# Patient Record
Sex: MALE | Race: WHITE | NOT HISPANIC OR LATINO | Employment: STUDENT | URBAN - METROPOLITAN AREA
[De-identification: names, ages, dates, MRNs, and addresses within clinical notes are randomized per-mention and may not be internally consistent; named-entity substitution may affect disease eponyms.]

---
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
## 2024-01-22 ENCOUNTER — OFFICE VISIT (OUTPATIENT)
Dept: PSYCHIATRY | Facility: CLINIC | Age: 19
End: 2024-01-22
Payer: COMMERCIAL

## 2024-01-22 VITALS
DIASTOLIC BLOOD PRESSURE: 90 MMHG | BODY MASS INDEX: 41.03 KG/M2 | HEART RATE: 85 BPM | HEIGHT: 70 IN | WEIGHT: 286.6 LBS | SYSTOLIC BLOOD PRESSURE: 147 MMHG

## 2024-01-22 DIAGNOSIS — F41.1 GENERALIZED ANXIETY DISORDER: ICD-10-CM

## 2024-01-22 DIAGNOSIS — F32.4 MAJOR DEPRESSIVE DISORDER WITH SINGLE EPISODE, IN PARTIAL REMISSION (HCC): Primary | ICD-10-CM

## 2024-01-22 DIAGNOSIS — F33.41 RECURRENT MAJOR DEPRESSIVE DISORDER, IN PARTIAL REMISSION (HCC): ICD-10-CM

## 2024-01-22 PROCEDURE — 90833 PSYTX W PT W E/M 30 MIN: CPT | Performed by: STUDENT IN AN ORGANIZED HEALTH CARE EDUCATION/TRAINING PROGRAM

## 2024-01-22 PROCEDURE — 99214 OFFICE O/P EST MOD 30 MIN: CPT | Performed by: STUDENT IN AN ORGANIZED HEALTH CARE EDUCATION/TRAINING PROGRAM

## 2024-01-22 RX ORDER — ESCITALOPRAM OXALATE 10 MG/1
10 TABLET ORAL DAILY
Qty: 90 TABLET | Refills: 0 | Status: SHIPPED | OUTPATIENT
Start: 2024-01-22

## 2024-01-22 RX ORDER — ESCITALOPRAM OXALATE 20 MG/1
20 TABLET ORAL DAILY
Qty: 90 TABLET | Refills: 0 | Status: SHIPPED | OUTPATIENT
Start: 2024-01-22 | End: 2024-04-21

## 2024-01-22 RX ORDER — BUPROPION HYDROCHLORIDE 100 MG/1
TABLET ORAL
Qty: 45 TABLET | Refills: 1 | Status: SHIPPED | OUTPATIENT
Start: 2024-01-22 | End: 2024-02-01 | Stop reason: SDUPTHER

## 2024-01-22 NOTE — BH CRISIS PLAN
Client Name: Vazquez Lopez       Client YOB: 2005    [facility]      Creation Date: 1/22/24 Update Date: 1/22/24   Created By: Duke Sanon MD Last Updated By: Duke Sanon MD      Step 1: Warning Signs:   Warning Signs   Stop playing video games, missing days of work, gains in weight            Step 2: Internal Coping Strategies:   Internal Coping Strategies   Playing video games, listening to music            Step 3: People and social settings that provide distraction:   Name Contact Information   Work, spending time with friends Calling phone            Step 4: People whom I can ask for help during a crisis:      Name Contact Information    Group of friends Call them    Parents Call them      Step 5: Professionals or agencies I can contact during a crisis:      Clinican/Agency Name Phone Emergency Contact    Duke Sanon M.D. [phone]       [facility] Emergency [facility] Emergency Department Phone Emergency Department Address    [id number]          Crisis Phone Numbers:   Suicide Prevention Lifeline: Call or Text  [address] Crisis Text Line: Text HOME to 931-746   Please note: Some [facility] do not have a separate number for Child/Adolescent specific crisis. If your county is not listed under Child/Adolescent, please call the adult number for your county      Adult Crisis Numbers: Child/Adolescent Crisis Numbers   [facility]: [phone] [facility]: [phone]   [facility]: [phone] [facility]: [phone]   [facility]: [phone] [place]: [phone]   [facility]: [phone] [place]: [phone]   [place]: [phone]   [facility]: [phone]   [facility]: [phone]   [facility]: [phone] (daytime) [phone] (after hours, weekends, holidays)      Step 6: Making the environment safer (plan for lethal means safety):   Patient did not identify any lethal methods:  Yes     Optional: What is most important to me and worth living for?   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 Taya Hopkins and Fer Astudillo. Used with permission of the authors.

## 2024-01-22 NOTE — BH TREATMENT PLAN
TREATMENT PLAN (Medication Management Only)        [facility]    Name and Date of Birth:  Vazquez Lopez 18 y.o. 2005  Date of Treatment Plan: January 22, 2024  Diagnosis/Diagnoses:    1. Major depressive disorder with single episode, in partial remission (HCC)    2. Generalized anxiety disorder    3. Recurrent major depressive disorder, in partial remission (HCC)      Strengths/Personal Resources for Self-Care: supportive family, taking medications as prescribed, ability to communicate needs, ability to listen.  Area/Areas of need (in own words): anxiety symptoms, depressive symptoms.  1. Long Term Goal: improve anxiety, improve depression.   Target Date: 1 year - 1/22/2025  Person/Persons responsible for completion of goal: Janette Sanon M.D.  2.  Short Term Objective (s) - How will we reach this goal?:   A.  Provider new recommended medication/dosage changes and/or continue medication(s): continue current medications as prescribed.    Target Date: 3 months - 4/22/2024  Person/Persons Responsible for Completion of Goal: Janette Sanon M.D.  Progress Towards Goals: continuing treatment  Treatment Modality: medication management every 3 months  Review due 6 months from date of this plan: 6 months - 7/22/2024  Expected length of service: maintenance unless revised  My Physician/PA/NP and I have developed this plan together and I agree to work on the goals and objectives. I understand the treatment goals that were developed for my treatment.
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
## 2024-02-01 ENCOUNTER — TELEPHONE (OUTPATIENT)
Dept: PSYCHIATRY | Facility: CLINIC | Age: 19
End: 2024-02-01

## 2024-02-01 DIAGNOSIS — F32.4 MAJOR DEPRESSIVE DISORDER WITH SINGLE EPISODE, IN PARTIAL REMISSION (HCC): ICD-10-CM

## 2024-02-01 RX ORDER — BUPROPION HYDROCHLORIDE 100 MG/1
TABLET ORAL
Qty: 45 TABLET | Refills: 0 | Status: SHIPPED | OUTPATIENT
Start: 2024-02-01

## 2024-02-01 NOTE — TELEPHONE ENCOUNTER
"Received a fax from [facility] requesting clarification for the prescription bupropion 100 mg sent with a refill. Requested clarification, noting prescription sent with 1 RF, with instructions to discontinue. \"Does the patient NEED the ONE refill?\"     Form with nursing or rx can be resent without refills.   "
Expected Date Of Service: 10/21/2021
Lab Facility: [id number]
Performing Laboratory: [id number]
Bill For Surgical Tray: no
Billing Type: Third-Party Bill

## 2024-02-02 ENCOUNTER — HOSPITAL ENCOUNTER (EMERGENCY)
Facility: HOSPITAL | Age: 19
Discharge: HOME | End: 2024-02-02
Attending: EMERGENCY MEDICINE
Payer: COMMERCIAL

## 2024-02-02 VITALS
DIASTOLIC BLOOD PRESSURE: 84 MMHG | OXYGEN SATURATION: 95 % | HEART RATE: 87 BPM | TEMPERATURE: 98.5 F | SYSTOLIC BLOOD PRESSURE: 134 MMHG | RESPIRATION RATE: 17 BRPM

## 2024-02-02 DIAGNOSIS — S01.112A LEFT EYELID LACERATION, INITIAL ENCOUNTER: Primary | ICD-10-CM

## 2024-02-02 PROCEDURE — 90471 IMMUNIZATION ADMIN: CPT

## 2024-02-02 PROCEDURE — 99284 EMERGENCY DEPT VISIT MOD MDM: CPT | Performed by: EMERGENCY MEDICINE

## 2024-02-02 PROCEDURE — 90715 TDAP VACCINE 7 YRS/> IM: CPT

## 2024-02-02 PROCEDURE — 12014 RPR F/E/E/N/L/M 5.1-7.5 CM: CPT | Performed by: EMERGENCY MEDICINE

## 2024-02-02 PROCEDURE — 99283 EMERGENCY DEPT VISIT LOW MDM: CPT

## 2024-02-02 RX ORDER — LIDOCAINE HYDROCHLORIDE 10 MG/ML
10 INJECTION, SOLUTION EPIDURAL; INFILTRATION; INTRACAUDAL; PERINEURAL ONCE
Status: COMPLETED | OUTPATIENT
Start: 2024-02-02 | End: 2024-02-02

## 2024-02-02 RX ADMIN — TETANUS TOXOID, REDUCED DIPHTHERIA TOXOID AND ACELLULAR PERTUSSIS VACCINE, ADSORBED 0.5 ML: 5; 2.5; 8; 8; 2.5 SUSPENSION INTRAMUSCULAR at 14:36

## 2024-02-02 RX ADMIN — LIDOCAINE HYDROCHLORIDE 10 ML: 10 INJECTION, SOLUTION EPIDURAL; INFILTRATION; INTRACAUDAL; PERINEURAL at 15:35

## 2024-02-02 NOTE — DISCHARGE INSTRUCTIONS
Please follow-up with ophthalmology for reevaluation of your eyelid laceration.  Either be seen by ophthalmology in 5 to 7 days or return to the emergency department or your primary care office in 5 to 7 days to have the sutures removed.    Return to the emergency department if:   You have heavy bleeding or bleeding that does not stop after 10 minutes of holding firm, direct pressure over the wound.  Your wound opens up.  You have a fever or chills.  Your laceration is red, warm, or swollen.  You have red streaks on your skin coming from your wound.  You have white or yellow drainage from the wound that smells bad.

## 2024-02-02 NOTE — ED PROVIDER NOTES
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 Pt reports holding  and a [place] of wind blew tint away went to grab it and sliced L eyelid.      18M accidentally lacerated left eyelid with box-cutter while working. Did not hit eye, no change in vision.  Last tetanus was approximately a decade ago.  No other injuries.  Review of systems performed and otherwise negative.        Prior to Admission Medications   Prescriptions Last Dose Informant Patient Reported? Taking?   buPROPion (WELLBUTRIN) 100 mg tablet   No No   Sig: Take 100 mg for 1 month, then may decrease to 50 mg for 1 month, then discontinue   buPROPion (Wellbutrin XL) 150 mg 24 hr tablet   No No   Sig: Take 1 tablet (150 mg total) by mouth daily   escitalopram (LEXAPRO) 10 mg tablet   No No   Sig: Take 1 tablet (10 mg total) by mouth daily   escitalopram (LEXAPRO) 20 mg tablet   No No   Sig: Take 1 tablet (20 mg total) by mouth daily      Facility-Administered Medications: None       Past Medical History:   Diagnosis Date    Anxiety     Depression     Head injury     Suicide attempt (HCC)        History reviewed. No pertinent surgical history.    Family History   Problem Relation Age of Onset    No Known Problems Mother     Diabetes Father     Hypertension Father     Hypertension Maternal Grandmother     Atrial fibrillation Maternal Grandmother     COPD Maternal Grandfather     Diabetes Paternal Grandfather     Diabetes Paternal Uncle      I have reviewed and agree with the history as documented.    E-Cigarette/Vaping    E-Cigarette Use Current Some Day User      E-Cigarette/Vaping Substances    Nicotine Yes     THC No     CBD No     Flavoring No     Other No     Unknown No      Social History     Tobacco Use    Smoking status: Never    Smokeless tobacco: Never   Vaping Use    Vaping status: Some Days    Substances: Nicotine   Substance Use Topics    Alcohol use: Never    Drug use:  Yes     Frequency: 1.0 times per week     Types: Marijuana        Review of Systems   All other systems reviewed and are negative.      Physical Exam  ED Triage Vitals [02/02/24 1351]   Temperature Pulse Respirations Blood Pressure SpO2   98.5 °F (36.9 °C) 87 17 134/84 95 %      Temp Source Heart Rate Source Patient Position - Orthostatic VS BP Location FiO2 (%)   Oral Monitor Sitting Right arm --      Pain Score       No Pain             Orthostatic Vital Signs  Vitals:    02/02/24 1351   BP: 134/84   Pulse: 87   Patient Position - Orthostatic VS: Sitting       Physical Exam  Vitals and nursing note reviewed.   Constitutional:       General: He is not in acute distress.     Appearance: He is not ill-appearing.   HENT:      Head: Normocephalic and atraumatic.      Nose: Nose normal.      Mouth/Throat:      Mouth: Mucous membranes are moist.      Pharynx: Oropharynx is clear.   Eyes:      Comments: Laceration traveling horizontally on the superior eyelid, several centimeters away from the medial canthus, superficial, without muscular involvement   Cardiovascular:      Rate and Rhythm: Normal rate and regular rhythm.   Pulmonary:      Effort: Pulmonary effort is normal.      Breath sounds: Normal breath sounds.   Abdominal:      General: Abdomen is flat. Bowel sounds are normal.      Palpations: Abdomen is soft.   Musculoskeletal:         General: Normal range of motion.      Cervical back: Normal range of motion.   Skin:     General: Skin is warm and dry.   Neurological:      General: No focal deficit present.      Mental Status: He is alert and oriented to person, place, and time.   Psychiatric:         Mood and Affect: Mood normal.         ED Medications  Medications   tetanus-diphtheria-acellular pertussis (BOOSTRIX) IM injection 0.5 mL (0.5 mL Intramuscular Given 2/2/24 1436)   lidocaine (PF) (XYLOCAINE-MPF) 1 % injection 10 mL (10 mL Infiltration Given by Other 2/2/24 1535)       Diagnostic Studies  Results  "Reviewed       None                   No orders to display         Procedures  Universal Protocol:  Consent: Verbal consent obtained.  Consent given by: patient  Time out: Immediately prior to procedure a \"time out\" was called to verify the correct patient, procedure, equipment, support staff and site/side marked as required.  Patient identity confirmed: verbally with patient  Laceration repair    Date/Time: 2/2/2024 9:14 PM    Performed by: Roger Bradford MD  Authorized by: Roger Bradford MD  Body area: head/neck  Location details: left eyelid  Laceration length: 6 cm  Foreign bodies: no foreign bodies  Tendon involvement: none  Nerve involvement: none  Vascular damage: no  Anesthesia: local infiltration and nerve block    Anesthesia:  Local Anesthetic: lidocaine 1% without epinephrine    Sedation:  Patient sedated: no      Wound Dehiscence:  Superficial Wound Dehiscence: simple closure      Procedure Details:  Irrigation solution: saline  Irrigation method: jet lavage and syringe  Amount of cleaning: extensive  Debridement: none  Degree of undermining: none  Skin closure: 6-0 Prolene  Number of sutures: 9  Technique: simple  Approximation: close  Approximation difficulty: simple  Patient tolerance: patient tolerated the procedure well with no immediate complications            ED Course                                       Medical Decision Making  Given no medial involvement, no involvement of eyelid margin, not overlying the lacrimal duct or tach, paired laceration in the emergency department.  Updated patient's tetanus.  Recommended follow-up with ophthalmology in 5 to 7 days for suture removal.  Return precautions discussed, all questions answered prior to discharge.    Risk  Prescription drug management.          Disposition  Final diagnoses:   Left eyelid laceration, initial encounter     Time reflects when diagnosis was documented in both MDM as applicable and the Disposition within this note  "      Time User Action Codes Description Comment    2/2/2024  3:27 PM Roger Bradford Add [S01.112A] Left eyelid laceration, initial encounter           ED Disposition       ED Disposition   Discharge    Condition   Stable    Date/Time   Fri Feb 2, 2024  3:27 PM    Comment   Vazquez Lopez discharge to home/self care.                   Follow-up Information       Follow up With Specialties Details Why Contact Info    [facility]   [address] [place] [id number]  [phone]      Augusto Beaulieu MD Ophthalmology   [address] [place] [id number]  [phone]              Discharge Medication List as of 2/2/2024  3:29 PM        CONTINUE these medications which have NOT CHANGED    Details   buPROPion (WELLBUTRIN) 100 mg tablet Take 100 mg for 1 month, then may decrease to 50 mg for 1 month, then discontinue, Normal      buPROPion (Wellbutrin XL) 150 mg 24 hr tablet Take 1 tablet (150 mg total) by mouth daily, Starting Wed 12/27/2023, Normal      !! escitalopram (LEXAPRO) 10 mg tablet Take 1 tablet (10 mg total) by mouth daily, Starting Mon 1/22/2024, Normal      !! escitalopram (LEXAPRO) 20 mg tablet Take 1 tablet (20 mg total) by mouth daily, Starting Mon 1/22/2024, Until Sun 4/21/2024, Normal       !! - Potential duplicate medications found. Please discuss with provider.            PDMP Review         Value Time User    PDMP Reviewed  Yes 12/5/2022  8:01 AM HERMILO Castillo             ED Provider  Attending physically available and evaluated Vazquez Lopez. I managed the patient along with the ED Attending.    Electronically Signed by           Roger Bradford MD  02/02/24 9433

## 2024-02-02 NOTE — ED ATTENDING ATTESTATION
2/2/2024  I, Ori Miles MD, saw and evaluated the patient. I have discussed the patient with the resident/non-physician practitioner and agree with the resident's/non-physician practitioner's findings, Plan of Care, and MDM as documented in the resident's/non-physician practitioner's note, except where noted. All available labs and Radiology studies were reviewed.  I was present for key portions of any procedure(s) performed by the resident/non-physician practitioner and I was immediately available to provide assistance.       At this point I agree with the current assessment done in the Emergency Department.  I have conducted an independent evaluation of this patient a history and physical is as follows:    Patient presented with laceration over left eye after accidentally cutting himself with a .  On exam, laceration noted above left eyelid, no ocular injury.    ED Course     Laceration repaired by resident in ED under my supervision.    Critical Care Time  Procedures

## 2024-03-28 ENCOUNTER — TELEPHONE (OUTPATIENT)
Dept: PSYCHIATRY | Facility: CLINIC | Age: 19
End: 2024-03-28

## 2024-03-28 NOTE — TELEPHONE ENCOUNTER
Called and left message for Mother. She returned call and rescheduled 4/22 3PM tp 4/29 12PM in office. Provider out of office 4/22.

## 2024-04-29 ENCOUNTER — OFFICE VISIT (OUTPATIENT)
Dept: PSYCHIATRY | Facility: CLINIC | Age: 19
End: 2024-04-29

## 2024-04-29 VITALS
SYSTOLIC BLOOD PRESSURE: 135 MMHG | HEART RATE: 83 BPM | WEIGHT: 294.2 LBS | DIASTOLIC BLOOD PRESSURE: 77 MMHG | BODY MASS INDEX: 42.21 KG/M2

## 2024-04-29 DIAGNOSIS — F32.4 MAJOR DEPRESSIVE DISORDER WITH SINGLE EPISODE, IN PARTIAL REMISSION (HCC): ICD-10-CM

## 2024-04-29 DIAGNOSIS — F33.41 RECURRENT MAJOR DEPRESSIVE DISORDER, IN PARTIAL REMISSION (HCC): Primary | ICD-10-CM

## 2024-04-29 DIAGNOSIS — F41.1 GENERALIZED ANXIETY DISORDER: ICD-10-CM

## 2024-04-29 PROBLEM — F32.A DEPRESSION: Status: RESOLVED | Noted: 2021-12-23 | Resolved: 2024-04-29

## 2024-04-29 RX ORDER — ESCITALOPRAM OXALATE 20 MG/1
20 TABLET ORAL DAILY
Qty: 90 TABLET | Refills: 0 | Status: SHIPPED | OUTPATIENT
Start: 2024-04-29 | End: 2024-07-28

## 2024-04-29 RX ORDER — ESCITALOPRAM OXALATE 10 MG/1
10 TABLET ORAL DAILY
Qty: 90 TABLET | Refills: 0 | Status: SHIPPED | OUTPATIENT
Start: 2024-04-29

## 2024-04-29 NOTE — Clinical Note
Will transfer to an adult provider- any adult provider/resident would be fine.  Next visit in 3-4 months is fine.  Will continue to see me until has DAYAMI visit.
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
## 2024-04-30 ENCOUNTER — TELEPHONE (OUTPATIENT)
Dept: PSYCHIATRY | Facility: CLINIC | Age: 19
End: 2024-04-30

## 2024-04-30 NOTE — TELEPHONE ENCOUNTER
Reached out to patient in regards to scheduling DAYAMI appointment.  Left VM for pt to contact intake department.

## 2024-04-30 NOTE — TELEPHONE ENCOUNTER
----- Message from Duke Sanon MD sent at 4/29/2024  5:57 PM EDT -----  Will transfer to an adult provider- any adult provider/resident would be fine.  Next visit in 3-4 months is fine.  Will continue to see me until has DAYAMI visit.

## 2024-05-21 ENCOUNTER — TELEPHONE (OUTPATIENT)
Dept: PSYCHIATRY | Facility: CLINIC | Age: 19
End: 2024-05-21

## 2024-05-21 NOTE — TELEPHONE ENCOUNTER
Writer called and LVM to call resident line to make DAYAMI appt with new resident.  Per Fab.  Transfer call to resident to make f/u appt

## 2024-05-22 NOTE — TELEPHONE ENCOUNTER
Patients Mother called and  requested a call back to discuss scheduling.    They can be reached at P# [phone].       Thank you.

## 2024-08-05 ENCOUNTER — OFFICE VISIT (OUTPATIENT)
Dept: PSYCHIATRY | Facility: CLINIC | Age: 19
End: 2024-08-05
Payer: COMMERCIAL

## 2024-08-05 DIAGNOSIS — F33.42 MAJOR DEPRESSIVE DISORDER, RECURRENT EPISODE, IN FULL REMISSION (HCC): Primary | ICD-10-CM

## 2024-08-05 PROCEDURE — 90792 PSYCH DIAG EVAL W/MED SRVCS: CPT

## 2024-08-05 RX ORDER — ESCITALOPRAM OXALATE 10 MG/1
10 TABLET ORAL DAILY
Qty: 30 TABLET | Refills: 2 | Status: SHIPPED | OUTPATIENT
Start: 2024-08-05

## 2024-08-05 RX ORDER — ESCITALOPRAM OXALATE 20 MG/1
20 TABLET ORAL DAILY
Qty: 30 TABLET | Refills: 2 | Status: SHIPPED | OUTPATIENT
Start: 2024-08-05
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
## 2024-08-05 NOTE — BH TREATMENT PLAN
TREATMENT PLAN - Medication Management        [facility] - PSYCHIATRIC [facility]OCIATES    Name and Date of Birth:  Vazquez Lopez 18 y.o. 2005  Date of Treatment Plan: August 5, 2024  Diagnosis/Diagnoses:    1. Recurrent major depressive disorder, in partial remission (HCC)        Strengths/Personal Resources for Self-Care: supportive family, supportive friends, taking medications as prescribed, stable employment    Area/Areas of need: depression    Long Term Goal: maintain stability of depression  Target Date: 6 months - February 5, 2025  Person/Persons responsible for completion of goal: Vazquez and Ranjeet Osborne MD     Short Term Objective (s) - How will we reach this goal?:   Take medications as prescribed  Attend psychiatry appointments regularly  Start psychotherapy  Target Date: 6 months - February 5, 2025  Person/Persons Responsible for Completion of Goal: Vazquez     Progress Towards Goals: Continuing treatment    Treatment Modality: medication management every 3 months or sooner if needed, consider starting psychotherapy in the future, and follow up with PCP  Review due 180 days from date of this plan: February 1, 2025   Expected length of service: Ongoing treatment    My physician and I have developed this plan together, and I agree to work on the goals and objectives. I understand the treatment goals that were developed for my treatment.    The treatment plan was created between Ranjeet Osborne MD and Vazquez Lopez on 08/05/24 at 9:46 AM.

## 2024-08-30 ENCOUNTER — OFFICE VISIT (OUTPATIENT)
Dept: FAMILY MEDICINE CLINIC | Facility: CLINIC | Age: 19
End: 2024-08-30
Payer: COMMERCIAL

## 2024-08-30 VITALS
DIASTOLIC BLOOD PRESSURE: 96 MMHG | RESPIRATION RATE: 16 BRPM | BODY MASS INDEX: 42.66 KG/M2 | HEIGHT: 70 IN | HEART RATE: 94 BPM | SYSTOLIC BLOOD PRESSURE: 130 MMHG | WEIGHT: 298 LBS | TEMPERATURE: 97.5 F | OXYGEN SATURATION: 96 %

## 2024-08-30 DIAGNOSIS — F33.42 MAJOR DEPRESSIVE DISORDER, RECURRENT EPISODE, IN FULL REMISSION (HCC): ICD-10-CM

## 2024-08-30 DIAGNOSIS — Z13.6 ENCOUNTER FOR LIPID SCREENING FOR CARDIOVASCULAR DISEASE: ICD-10-CM

## 2024-08-30 DIAGNOSIS — Z13.1 SCREENING FOR DIABETES MELLITUS (DM): ICD-10-CM

## 2024-08-30 DIAGNOSIS — Z00.00 ANNUAL PHYSICAL EXAM: Primary | ICD-10-CM

## 2024-08-30 DIAGNOSIS — Z13.220 ENCOUNTER FOR LIPID SCREENING FOR CARDIOVASCULAR DISEASE: ICD-10-CM

## 2024-08-30 PROCEDURE — 99395 PREV VISIT EST AGE 18-39: CPT | Performed by: FAMILY MEDICINE

## 2024-08-30 NOTE — PROGRESS NOTES
Adult Annual Physical  Name: Vazquez Lopez      : 2005      MRN: [id number]  Encounter Provider: Benigno Montero DO  Encounter Date: 2024   Encounter department: [facility]    Assessment & Plan   1. Annual physical exam  2. Encounter for lipid screening for cardiovascular disease  -     Lipid Panel With Direct LDL; Future  3. Screening for diabetes mellitus (DM)  -     Comprehensive metabolic panel; Future  4. Major depressive disorder, recurrent episode, in full remission (HCC)  Assessment & Plan:  Stable.  Continues on Lexapro 10 mg p.o. daily.  Denies any SI or HI.  Following with behavioral health.  Immunizations and preventive care screenings were discussed with patient today. Appropriate education was printed on patient's after visit summary.    Counseling:  Alcohol/drug use: discussed moderation in alcohol intake, the recommendations for healthy alcohol use, and avoidance of illicit drug use.  Dental Health: discussed importance of regular tooth brushing, flossing, and dental visits.  Injury prevention: discussed safety/seat belts, safety helmets, smoke detectors, carbon dioxide detectors, and smoking near bedding or upholstery.  Sexual health: discussed sexually transmitted diseases, partner selection, use of condoms, avoidance of unintended pregnancy, and contraceptive alternatives.  Exercise: the importance of regular exercise/physical activity was discussed. Recommend exercise 3-5 times per week for at least 30 minutes.          History of Present Illness     Adult Annual Physical:  Patient presents for annual physical.     Diet and Physical Activity:  - Diet/Nutrition: well balanced diet and consuming 3-5 servings of fruits/vegetables daily.  - Exercise: moderate cardiovascular exercise.    General Health:  - Sleep: sleeps well and 7-8 hours of sleep on average.  - Hearing: normal hearing bilateral ears.  - Vision: goes for regular eye exams.  - Dental: brushes teeth  "twice daily.     Health:  - History of STDs: no.   - Urinary symptoms: none.     Advanced Care Planning:  - Has an advanced directive?: no    - Has a durable medical POA?: no    - ACP document given to patient?: no      Review of Systems   Constitutional:  Negative for chills and fever.   HENT:  Negative for ear pain and sore throat.    Eyes:  Negative for pain and visual disturbance.   Respiratory:  Negative for cough and shortness of breath.    Cardiovascular:  Negative for chest pain and palpitations.   Gastrointestinal:  Negative for abdominal pain and vomiting.   Endocrine: Negative for polydipsia and polyuria.   Genitourinary:  Negative for dysuria and hematuria.   Musculoskeletal:  Negative for arthralgias and back pain.   Skin:  Negative for color change and rash.   Neurological:  Negative for dizziness, seizures, syncope and headaches.   Psychiatric/Behavioral:  Negative for confusion and sleep disturbance. The patient is not nervous/anxious.    All other systems reviewed and are negative.        Objective     /96 (BP Location: Left arm, Patient Position: Sitting, Cuff Size: Large)   Pulse 94   Temp 97.5 °F (36.4 °C) (Tympanic)   Resp 16   Ht 5' 10.35\" (1.787 m)   Wt 135 kg (298 lb)   SpO2 96%   BMI 42.33 kg/m²     Physical Exam  Vitals and nursing note reviewed.   Constitutional:       General: He is not in acute distress.     Appearance: Normal appearance.   HENT:      Head: Normocephalic and atraumatic.      Right Ear: Tympanic membrane and external ear normal.      Left Ear: Tympanic membrane and external ear normal.      Nose: Nose normal.      Mouth/Throat:      Mouth: Mucous membranes are moist.   Eyes:      Extraocular Movements: Extraocular movements intact.      Conjunctiva/sclera: Conjunctivae normal.      Pupils: Pupils are equal, round, and reactive to light.   Cardiovascular:      Rate and Rhythm: Normal rate and regular rhythm.      Pulses: Normal pulses.      Heart sounds: " Normal heart sounds. No murmur heard.  Pulmonary:      Effort: Pulmonary effort is normal.      Breath sounds: Normal breath sounds. No wheezing, rhonchi or rales.   Abdominal:      General: Bowel sounds are normal.      Palpations: Abdomen is soft.      Tenderness: There is no abdominal tenderness. There is no guarding.   Musculoskeletal:         General: Normal range of motion.      Cervical back: Normal range of motion.      Right lower leg: No edema.      Left lower leg: No edema.   Lymphadenopathy:      Cervical: No cervical adenopathy.   Skin:     General: Skin is warm.      Capillary Refill: Capillary refill takes less than 2 seconds.   Neurological:      General: No focal deficit present.      Mental Status: He is alert and oriented to person, place, and time.   Psychiatric:         Mood and Affect: Mood normal.         Behavior: Behavior normal.

## 2024-08-30 NOTE — PATIENT INSTRUCTIONS
"Patient Education     Routine physical for adults   The Basics   Written by the doctors and editors at [facility]   What is a physical? -- A physical is a routine visit, or \"check-up,\" with your doctor. You might also hear it called a \"wellness visit\" or \"preventive visit.\"  During each visit, the doctor will:   Ask about your physical and mental health   Ask about your habits, behaviors, and lifestyle   Do an exam   Give you vaccines if needed   Talk to you about any medicines you take   Give advice about your health   Answer your questions  Getting regular check-ups is an important part of taking care of your health. It can help your doctor find and treat any problems you have. But it's also important for preventing health problems.  A routine physical is different from a \"sick visit.\" A sick visit is when you see a doctor because of a health concern or problem. Since physicals are scheduled ahead of time, you can think about what you want to ask the doctor.  How often should I get a physical? -- It depends on your age and health. In general, for people age 21 years and older:   If you are younger than 50 years, you might be able to get a physical every 3 years.   If you are 50 years or older, your doctor might recommend a physical every year.  If you have an ongoing health condition, like diabetes or high blood pressure, your doctor will probably want to see you more often.  What happens during a physical? -- In general, each visit will include:   Physical exam - The doctor or nurse will check your height, weight, heart rate, and blood pressure. They will also look at your eyes and ears. They will ask about how you are feeling and whether you have any symptoms that bother you.   Medicines - It's a good idea to bring a list of all the medicines you take to each doctor visit. Your doctor will talk to you about your medicines and answer any questions. Tell them if you are having any side effects that bother you. You " "should also tell them if you are having trouble paying for any of your medicines.   Habits and behaviors - This includes:   Your diet   Your exercise habits   Whether you smoke, drink alcohol, or use drugs   Whether you are sexually active   Whether you feel safe at home  Your doctor will talk to you about things you can do to improve your health and lower your risk of health problems. They will also offer help and support. For example, if you want to quit smoking, they can give you advice and might prescribe medicines. If you want to improve your diet or get more physical activity, they can help you with this, too.   Lab tests, if needed - The tests you get will depend on your age and situation. For example, your doctor might want to check your:   Cholesterol   Blood sugar   Iron level   Vaccines - The recommended vaccines will depend on your age, health, and what vaccines you already had. Vaccines are very important because they can prevent certain serious or deadly infections.   Discussion of screening - \"Screening\" means checking for diseases or other health problems before they cause symptoms. Your doctor can recommend screening based on your age, risk, and preferences. This might include tests to check for:   Cancer, such as breast, prostate, cervical, ovarian, colorectal, prostate, lung, or skin cancer   Sexually transmitted infections, such as chlamydia and gonorrhea   Mental health conditions like depression and anxiety  Your doctor will talk to you about the different types of screening tests. They can help you decide which screenings to have. They can also explain what the results might mean.   Answering questions - The physical is a good time to ask the doctor or nurse questions about your health. If needed, they can refer you to other doctors or specialists, too.  Adults older than 65 years often need other care, too. As you get older, your doctor will talk to you about:   How to prevent falling at " home   Hearing or vision tests   Memory testing   How to take your medicines safely   Making sure that you have the help and support you need at home  All topics are updated as new evidence becomes available and our peer review process is complete.  This topic retrieved from [employer] on: May 02, 2024.  Topic 508149 Version 1.0  Release: 32.4.3 - C32.122  © 2024 UpToDate, Inc. and/or its affiliates. All rights reserved.  Consumer Information Use and Disclaimer   Disclaimer: This generalized information is a limited summary of diagnosis, treatment, and/or medication information. It is not meant to be comprehensive and should be used as a tool to help the user understand and/or assess potential diagnostic and treatment options. It does NOT include all information about conditions, treatments, medications, side effects, or risks that may apply to a specific patient. It is not intended to be medical advice or a substitute for the medical advice, diagnosis, or treatment of a health care provider based on the health care provider's examination and assessment of a patient's specific and unique circumstances. Patients must speak with a health care provider for complete information about their health, medical questions, and treatment options, including any risks or benefits regarding use of medications. This information does not endorse any treatments or medications as safe, effective, or approved for treating a specific patient. UpToDate, Inc. and its affiliates disclaim any warranty or liability relating to this information or the use thereof.The use of this information is governed by the Terms of Use, available at https://www.w[employer] Laboruwer.com/en/know/clinical-effectiveness-terms. 2024© UpToDate, Inc. and its affiliates and/or licensors. All rights reserved.  Copyright   © 2024 UpToDate, Inc. and/or its affiliates. All rights reserved.

## 2024-09-17 NOTE — ASSESSMENT & PLAN NOTE
Stable.  Continues on Lexapro 10 mg p.o. daily.  Denies any SI or HI.  Following with behavioral health.

## 2024-09-27 LAB
25(OH)D3+25(OH)D2 SERPL-MCNC: 38.4 NG/ML (ref 30–100)
ALBUMIN SERPL-MCNC: 5.4 G/DL (ref 4.3–5.2)
ALP SERPL-CCNC: 86 IU/L (ref 51–125)
ALT SERPL-CCNC: 47 IU/L (ref 0–44)
AST SERPL-CCNC: 27 IU/L (ref 0–40)
BILIRUB SERPL-MCNC: 0.5 MG/DL (ref 0–1.2)
BUN SERPL-MCNC: 15 MG/DL (ref 6–20)
BUN/CREAT SERPL: 17 (ref 9–20)
CALCIUM SERPL-MCNC: 10.2 MG/DL (ref 8.7–10.2)
CHLORIDE SERPL-SCNC: 100 MMOL/L (ref 96–106)
CHOLEST SERPL-MCNC: 215 MG/DL (ref 100–169)
CO2 SERPL-SCNC: 21 MMOL/L (ref 20–29)
CREAT SERPL-MCNC: 0.86 MG/DL (ref 0.76–1.27)
EGFR: 128 ML/MIN/1.73
GLOBULIN SER-MCNC: 2.8 G/DL (ref 1.5–4.5)
GLUCOSE SERPL-MCNC: 93 MG/DL (ref 70–99)
HDLC SERPL-MCNC: 31 MG/DL
LDL CALC COMMENT: ABNORMAL
LDLC SERPL CALC-MCNC: 140 MG/DL (ref 0–109)
LDLC SERPL DIRECT ASSAY-MCNC: 136 MG/DL (ref 0–109)
POTASSIUM SERPL-SCNC: 4.5 MMOL/L (ref 3.5–5.2)
PROT SERPL-MCNC: 8.2 G/DL (ref 6–8.5)
SL AMB VLDL CHOLESTEROL CALC: 44 MG/DL (ref 5–40)
SODIUM SERPL-SCNC: 141 MMOL/L (ref 134–144)
TRIGL SERPL-MCNC: 244 MG/DL (ref 0–89)
TSH SERPL DL<=0.005 MIU/L-ACNC: 2.83 UIU/ML (ref 0.45–4.5)
VIT B12 SERPL-MCNC: 752 PG/ML (ref 232–1245)

## 2024-10-03 ENCOUNTER — OFFICE VISIT (OUTPATIENT)
Dept: LAB | Facility: CLINIC | Age: 19
End: 2024-10-03
Payer: COMMERCIAL

## 2024-10-03 DIAGNOSIS — F33.42 MAJOR DEPRESSIVE DISORDER, RECURRENT EPISODE, IN FULL REMISSION (HCC): ICD-10-CM

## 2024-10-03 PROCEDURE — 93005 ELECTROCARDIOGRAM TRACING: CPT

## 2024-10-04 LAB
ATRIAL RATE: 86 BPM
P AXIS: 51 DEGREES
PR INTERVAL: 152 MS
QRS AXIS: 58 DEGREES
QRSD INTERVAL: 90 MS
QT INTERVAL: 352 MS
QTC INTERVAL: 421 MS
T WAVE AXIS: 19 DEGREES
VENTRICULAR RATE: 86 BPM

## 2024-10-04 PROCEDURE — 93010 ELECTROCARDIOGRAM REPORT: CPT | Performed by: INTERNAL MEDICINE

## 2024-11-05 ENCOUNTER — OFFICE VISIT (OUTPATIENT)
Dept: PSYCHIATRY | Facility: CLINIC | Age: 19
End: 2024-11-05
Payer: COMMERCIAL

## 2024-11-05 DIAGNOSIS — F33.42 MAJOR DEPRESSIVE DISORDER, RECURRENT EPISODE, IN FULL REMISSION (HCC): Primary | ICD-10-CM

## 2024-11-05 PROCEDURE — 99213 OFFICE O/P EST LOW 20 MIN: CPT

## 2024-11-05 RX ORDER — ESCITALOPRAM OXALATE 20 MG/1
20 TABLET ORAL DAILY
Qty: 30 TABLET | Refills: 2 | Status: SHIPPED | OUTPATIENT
Start: 2024-11-05

## 2024-11-05 RX ORDER — ESCITALOPRAM OXALATE 10 MG/1
10 TABLET ORAL DAILY
Qty: 30 TABLET | Refills: 2 | Status: SHIPPED | OUTPATIENT
Start: 2024-11-05

## 2024-11-05 NOTE — ASSESSMENT & PLAN NOTE
Patient's depressive and anxious symptoms remain in remission on current therapy. At next appointment in 3 months, will discuss possibility, with the addition of psychotherapy, of reducing medication dose. Discussed risks of cannabis use including with his own risk factors concerning his history and the patient understands. He is not interested in discussing nicotine cessation at this time.    Reviewed laboratory studies from 9/25. Lipids including cholesterol elevated. B12, TSH, vitamin D, CMP normal. ECG from 10/3 normal with normal QTC.    Continue escitalopram 30 mg QD for major depression  Encourage psychotherapy  Motivational interviewing for nicotine and cannabis use cessation  See family doctor for elevated lipid levels    Orders:    escitalopram (LEXAPRO) 10 mg tablet; Take 1 tablet (10 mg total) by mouth daily Take with 20 mg tablet for a total of 30 mg daily    escitalopram (LEXAPRO) 20 mg tablet; Take 1 tablet (20 mg total) by mouth daily Take with 10 mg tablet for a total of 30 mg daily
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
## 2025-02-06 ENCOUNTER — TELEPHONE (OUTPATIENT)
Age: 20
End: 2025-02-06

## 2025-02-06 DIAGNOSIS — F33.42 MAJOR DEPRESSIVE DISORDER, RECURRENT EPISODE, IN FULL REMISSION (HCC): ICD-10-CM

## 2025-02-06 RX ORDER — ESCITALOPRAM OXALATE 10 MG/1
10 TABLET ORAL DAILY
Qty: 30 TABLET | Refills: 0 | Status: SHIPPED | OUTPATIENT
Start: 2025-02-06

## 2025-02-06 RX ORDER — ESCITALOPRAM OXALATE 20 MG/1
20 TABLET ORAL DAILY
Qty: 30 TABLET | Refills: 0 | Status: SHIPPED | OUTPATIENT
Start: 2025-02-06

## 2025-02-06 NOTE — TELEPHONE ENCOUNTER
Medication Refill Request     Name of Medication Lexapro  Dose/Frequency 10mg  Quantity 30 tab  Verified pharmacy   [x]  Verified ordering Provider   [x]  Does patient have enough for the next 3 days? Yes [x] No []  Does patient have a follow-up appointment scheduled? Yes [x] No []   If so when is appointment: 2/27 at 2:45pm    Medication Refill Request     Name of Medication Lexapro  Dose/Frequency 20mg  Quantity 30 tab  Verified pharmacy   [x]  Verified ordering Provider   [x]  Does patient have enough for the next 3 days? Yes [x] No []  Does patient have a follow-up appointment scheduled? Yes [x] No []   If so when is appointment: 2/27 at 2:45pm

## 2025-02-27 ENCOUNTER — OFFICE VISIT (OUTPATIENT)
Dept: PSYCHIATRY | Facility: CLINIC | Age: 20
End: 2025-02-27
Payer: COMMERCIAL

## 2025-02-27 DIAGNOSIS — F33.42 MAJOR DEPRESSIVE DISORDER, RECURRENT EPISODE, IN FULL REMISSION (HCC): Primary | ICD-10-CM

## 2025-02-27 PROCEDURE — 99213 OFFICE O/P EST LOW 20 MIN: CPT

## 2025-02-27 RX ORDER — ESCITALOPRAM OXALATE 20 MG/1
20 TABLET ORAL DAILY
Qty: 90 TABLET | Refills: 0 | Status: SHIPPED | OUTPATIENT
Start: 2025-02-27
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
## 2025-02-27 NOTE — BH TREATMENT PLAN
TREATMENT PLAN - Medication Management        [facility] - [facility]    Name and Date of Birth:  Vazquez Lopez 19 y.o. 2005  Date of Treatment Plan: February 27, 2025  Diagnosis/Diagnoses:    1. Major depressive disorder, recurrent episode, in full remission (HCC)        Strengths/Personal Resources for Self-Care: supportive family, taking medications as prescribed, ability to communicate needs, work skills    Area/Areas of need: depressive symptoms    Long Term Goal: remission of depression  Target Date: 6 months - August 27, 2025  Person/Persons responsible for completion of goal: Vazquez and Ranjeet Osborne MD     Short Term Objective (s) - How will we reach this goal?:   Take medications as prescribed  Attend psychiatry appointments regularly  Target Date: 6 months - August 27, 2025  Person/Persons Responsible for Completion of Goal: Vazquez     Progress Towards Goals: Continuing treatment    Treatment Modality: medication management every 1-3 months as needed  Review due 180 days from date of this plan: August 26, 2025   Expected length of service: Ongoing treatment    My physician and I have developed this plan together, and I agree to work on the goals and objectives. I understand the treatment goals that were developed for my treatment.    The treatment plan was created between Ranjeet Osborne MD and Vazquez Lopez on 02/27/25 at 3:22 PM.

## 2025-02-27 NOTE — ASSESSMENT & PLAN NOTE
Decrease escitalopram to 20 mg QD for antidepressant maintenance  Orders:  •  escitalopram (LEXAPRO) 20 mg tablet; Take 1 tablet (20 mg total) by mouth daily

## 2025-04-25 ENCOUNTER — APPOINTMENT (EMERGENCY)
Dept: RADIOLOGY | Facility: HOSPITAL | Age: 20
End: 2025-04-25
Payer: COMMERCIAL

## 2025-04-25 ENCOUNTER — APPOINTMENT (EMERGENCY)
Dept: CT IMAGING | Facility: HOSPITAL | Age: 20
End: 2025-04-25
Payer: COMMERCIAL

## 2025-04-25 ENCOUNTER — APPOINTMENT (OUTPATIENT)
Dept: RADIOLOGY | Facility: HOSPITAL | Age: 20
End: 2025-04-25
Payer: COMMERCIAL

## 2025-04-25 ENCOUNTER — HOSPITAL ENCOUNTER (EMERGENCY)
Facility: HOSPITAL | Age: 20
Discharge: HOME | End: 2025-04-26
Attending: SURGERY
Payer: COMMERCIAL

## 2025-04-25 DIAGNOSIS — K76.0 HEPATIC STEATOSIS: ICD-10-CM

## 2025-04-25 DIAGNOSIS — R16.0 HEPATOMEGALY: ICD-10-CM
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
## 2025-04-25 DIAGNOSIS — S01.01XA SCALP LACERATION, INITIAL ENCOUNTER: ICD-10-CM

## 2025-04-25 LAB
ABO GROUP BLD: NORMAL
ABO GROUP BLD: NORMAL
ANION GAP SERPL CALCULATED.3IONS-SCNC: 10 MMOL/L (ref 4–13)
ANION GAP SERPL CALCULATED.3IONS-SCNC: 10 MMOL/L (ref 4–13)
BASE EXCESS BLDA CALC-SCNC: 2 MMOL/L (ref -2–3)
BASE EXCESS BLDA CALC-SCNC: 2 MMOL/L (ref -2–3)
BASOPHILS # BLD AUTO: 0.07 THOUSANDS/ÂΜL (ref 0–0.1)
BASOPHILS # BLD AUTO: 0.07 THOUSANDS/ÂΜL (ref 0–0.1)
BASOPHILS NFR BLD AUTO: 1 % (ref 0–1)
BASOPHILS NFR BLD AUTO: 1 % (ref 0–1)
BLD GP AB SCN SERPL QL: NEGATIVE
BLD GP AB SCN SERPL QL: NEGATIVE
BUN SERPL-MCNC: 12 MG/DL (ref 5–25)
BUN SERPL-MCNC: 12 MG/DL (ref 5–25)
CA-I BLD-SCNC: 1.16 MMOL/L (ref 1.12–1.32)
CA-I BLD-SCNC: 1.16 MMOL/L (ref 1.12–1.32)
CALCIUM SERPL-MCNC: 9.5 MG/DL (ref 8.4–10.2)
CALCIUM SERPL-MCNC: 9.5 MG/DL (ref 8.4–10.2)
CHLORIDE SERPL-SCNC: 104 MMOL/L (ref 96–108)
CHLORIDE SERPL-SCNC: 104 MMOL/L (ref 96–108)
CO2 SERPL-SCNC: 24 MMOL/L (ref 21–32)
CO2 SERPL-SCNC: 24 MMOL/L (ref 21–32)
CREAT SERPL-MCNC: 0.82 MG/DL (ref 0.6–1.3)
CREAT SERPL-MCNC: 0.82 MG/DL (ref 0.6–1.3)
EOSINOPHIL # BLD AUTO: 0.37 THOUSAND/ÂΜL (ref 0–0.61)
EOSINOPHIL # BLD AUTO: 0.37 THOUSAND/ÂΜL (ref 0–0.61)
EOSINOPHIL NFR BLD AUTO: 4 % (ref 0–6)
EOSINOPHIL NFR BLD AUTO: 4 % (ref 0–6)
ERYTHROCYTE [DISTWIDTH] IN BLOOD BY AUTOMATED COUNT: 12.1 % (ref 11.6–15.1)
ERYTHROCYTE [DISTWIDTH] IN BLOOD BY AUTOMATED COUNT: 12.1 % (ref 11.6–15.1)
GFR SERPL CREATININE-BSD FRML MDRD: 127 ML/MIN/1.73SQ M
GFR SERPL CREATININE-BSD FRML MDRD: 127 ML/MIN/1.73SQ M
GLUCOSE SERPL-MCNC: 123 MG/DL (ref 65–140)
GLUCOSE SERPL-MCNC: 123 MG/DL (ref 65–140)
GLUCOSE SERPL-MCNC: 124 MG/DL (ref 65–140)
GLUCOSE SERPL-MCNC: 124 MG/DL (ref 65–140)
HCO3 BLDA-SCNC: 27.1 MMOL/L (ref 24–30)
HCO3 BLDA-SCNC: 27.1 MMOL/L (ref 24–30)
HCT VFR BLD AUTO: 44.4 % (ref 36.5–49.3)
HCT VFR BLD AUTO: 44.4 % (ref 36.5–49.3)
HCT VFR BLD CALC: 46 % (ref 36.5–49.3)
HCT VFR BLD CALC: 46 % (ref 36.5–49.3)
HGB BLD-MCNC: 15 G/DL (ref 12–17)
HGB BLD-MCNC: 15 G/DL (ref 12–17)
HGB BLDA-MCNC: 15.6 G/DL (ref 12–17)
HGB BLDA-MCNC: 15.6 G/DL (ref 12–17)
IMM GRANULOCYTES # BLD AUTO: 0.05 THOUSAND/UL (ref 0–0.2)
IMM GRANULOCYTES # BLD AUTO: 0.05 THOUSAND/UL (ref 0–0.2)
IMM GRANULOCYTES NFR BLD AUTO: 1 % (ref 0–2)
IMM GRANULOCYTES NFR BLD AUTO: 1 % (ref 0–2)
LYMPHOCYTES # BLD AUTO: 3.69 THOUSANDS/ÂΜL (ref 0.6–4.47)
LYMPHOCYTES # BLD AUTO: 3.69 THOUSANDS/ÂΜL (ref 0.6–4.47)
LYMPHOCYTES NFR BLD AUTO: 39 % (ref 14–44)
LYMPHOCYTES NFR BLD AUTO: 39 % (ref 14–44)
MCH RBC QN AUTO: 30.2 PG (ref 26.8–34.3)
MCH RBC QN AUTO: 30.2 PG (ref 26.8–34.3)
MCHC RBC AUTO-ENTMCNC: 33.8 G/DL (ref 31.4–37.4)
MCHC RBC AUTO-ENTMCNC: 33.8 G/DL (ref 31.4–37.4)
MCV RBC AUTO: 89 FL (ref 82–98)
MCV RBC AUTO: 89 FL (ref 82–98)
MONOCYTES # BLD AUTO: 0.92 THOUSAND/ÂΜL (ref 0.17–1.22)
MONOCYTES # BLD AUTO: 0.92 THOUSAND/ÂΜL (ref 0.17–1.22)
MONOCYTES NFR BLD AUTO: 10 % (ref 4–12)
MONOCYTES NFR BLD AUTO: 10 % (ref 4–12)
NEUTROPHILS # BLD AUTO: 4.41 THOUSANDS/ÂΜL (ref 1.85–7.62)
NEUTROPHILS # BLD AUTO: 4.41 THOUSANDS/ÂΜL (ref 1.85–7.62)
NEUTS SEG NFR BLD AUTO: 45 % (ref 43–75)
NEUTS SEG NFR BLD AUTO: 45 % (ref 43–75)
NRBC BLD AUTO-RTO: 0 /100 WBCS
NRBC BLD AUTO-RTO: 0 /100 WBCS
PCO2 BLD: 28 MMOL/L (ref 21–32)
PCO2 BLD: 28 MMOL/L (ref 21–32)
PCO2 BLD: 42.7 MM HG (ref 42–50)
PCO2 BLD: 42.7 MM HG (ref 42–50)
PH BLD: 7.41 [PH] (ref 7.3–7.4)
PH BLD: 7.41 [PH] (ref 7.3–7.4)
PLATELET # BLD AUTO: 304 THOUSANDS/UL (ref 149–390)
PLATELET # BLD AUTO: 304 THOUSANDS/UL (ref 149–390)
PMV BLD AUTO: 8.8 FL (ref 8.9–12.7)
PMV BLD AUTO: 8.8 FL (ref 8.9–12.7)
PO2 BLD: 36 MM HG (ref 35–45)
PO2 BLD: 36 MM HG (ref 35–45)
POTASSIUM BLD-SCNC: 4 MMOL/L (ref 3.5–5.3)
POTASSIUM BLD-SCNC: 4 MMOL/L (ref 3.5–5.3)
POTASSIUM SERPL-SCNC: 4.2 MMOL/L (ref 3.5–5.3)
POTASSIUM SERPL-SCNC: 4.2 MMOL/L (ref 3.5–5.3)
RBC # BLD AUTO: 4.97 MILLION/UL (ref 3.88–5.62)
RBC # BLD AUTO: 4.97 MILLION/UL (ref 3.88–5.62)
RH BLD: POSITIVE
RH BLD: POSITIVE
SAO2 % BLD FROM PO2: 70 % (ref 60–85)
SAO2 % BLD FROM PO2: 70 % (ref 60–85)
SODIUM BLD-SCNC: 140 MMOL/L (ref 136–145)
SODIUM BLD-SCNC: 140 MMOL/L (ref 136–145)
SODIUM SERPL-SCNC: 138 MMOL/L (ref 135–147)
SODIUM SERPL-SCNC: 138 MMOL/L (ref 135–147)
SPECIMEN EXPIRATION DATE: NORMAL
SPECIMEN EXPIRATION DATE: NORMAL
SPECIMEN SOURCE: ABNORMAL
SPECIMEN SOURCE: ABNORMAL
WBC # BLD AUTO: 9.51 THOUSAND/UL (ref 4.31–10.16)
WBC # BLD AUTO: 9.51 THOUSAND/UL (ref 4.31–10.16)

## 2025-04-25 PROCEDURE — 71045 X-RAY EXAM CHEST 1 VIEW: CPT

## 2025-04-25 PROCEDURE — 70450 CT HEAD/BRAIN W/O DYE: CPT

## 2025-04-25 PROCEDURE — 73630 X-RAY EXAM OF FOOT: CPT

## 2025-04-25 PROCEDURE — 71260 CT THORAX DX C+: CPT

## 2025-04-25 PROCEDURE — 82947 ASSAY GLUCOSE BLOOD QUANT: CPT

## 2025-04-25 PROCEDURE — 86901 BLOOD TYPING SEROLOGIC RH(D): CPT | Performed by: SURGERY

## 2025-04-25 PROCEDURE — 82330 ASSAY OF CALCIUM: CPT

## 2025-04-25 PROCEDURE — 86900 BLOOD TYPING SEROLOGIC ABO: CPT | Performed by: SURGERY

## 2025-04-25 PROCEDURE — 84132 ASSAY OF SERUM POTASSIUM: CPT

## 2025-04-25 PROCEDURE — EDAIR PR ED AIR: Performed by: EMERGENCY MEDICINE

## 2025-04-25 PROCEDURE — 74177 CT ABD & PELVIS W/CONTRAST: CPT

## 2025-04-25 PROCEDURE — NC001 PR NO CHARGE: Performed by: SURGERY

## 2025-04-25 PROCEDURE — 80048 BASIC METABOLIC PNL TOTAL CA: CPT | Performed by: SURGERY

## 2025-04-25 PROCEDURE — 82803 BLOOD GASES ANY COMBINATION: CPT

## 2025-04-25 PROCEDURE — 90715 TDAP VACCINE 7 YRS/> IM: CPT

## 2025-04-25 PROCEDURE — 36415 COLL VENOUS BLD VENIPUNCTURE: CPT | Performed by: SURGERY

## 2025-04-25 PROCEDURE — 99284 EMERGENCY DEPT VISIT MOD MDM: CPT

## 2025-04-25 PROCEDURE — 72125 CT NECK SPINE W/O DYE: CPT

## 2025-04-25 PROCEDURE — 84295 ASSAY OF SERUM SODIUM: CPT

## 2025-04-25 PROCEDURE — 90471 IMMUNIZATION ADMIN: CPT

## 2025-04-25 PROCEDURE — 85025 COMPLETE CBC W/AUTO DIFF WBC: CPT | Performed by: SURGERY

## 2025-04-25 PROCEDURE — 85014 HEMATOCRIT: CPT

## 2025-04-25 PROCEDURE — 86850 RBC ANTIBODY SCREEN: CPT | Performed by: SURGERY

## 2025-04-25 RX ADMIN — IOHEXOL 100 ML: 350 INJECTION, SOLUTION INTRAVENOUS at 22:04

## 2025-04-25 RX ADMIN — TETANUS TOXOID, REDUCED DIPHTHERIA TOXOID AND ACELLULAR PERTUSSIS VACCINE, ADSORBED 0.5 ML: 5; 2.5; 8; 8; 2.5 SUSPENSION INTRAMUSCULAR at 22:37

## 2025-04-25 NOTE — Clinical Note
Vazquez Lopez was seen and treated in our emergency department on 4/25/2025.                Diagnosis:     Vazquez  may return to work on return date.    He may return on this date: 04/28/2025         If you have any questions or concerns, please don't hesitate to call.      Olman Keita MD    ______________________________           _______________          _______________  Hospital Representative                              Date                                Time

## 2025-04-26 VITALS
TEMPERATURE: 98.2 F | OXYGEN SATURATION: 95 % | DIASTOLIC BLOOD PRESSURE: 86 MMHG | SYSTOLIC BLOOD PRESSURE: 163 MMHG | RESPIRATION RATE: 16 BRPM | HEART RATE: 88 BPM
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
## 2025-04-26 NOTE — ED NOTES
Patient transported out of the ED in a WC, AAOx4 resp even and unlabored with no S$S of distress.       Aureliano Mesa RN  04/26/25 0047
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
## 2025-04-26 NOTE — PROCEDURES
Universal Protocol:  Consent: Verbal consent obtained.  Consent given by: patient  Laceration repair    Date/Time: 4/25/2025 10:50 PM    Performed by: Olman Keita MD  Authorized by: Olman Keita MD  Body area: head/neck  Location details: scalp  Laceration length: 0.5 cm  Tendon involvement: none  Nerve involvement: none      Procedure Details:  Irrigation solution: saline  Irrigation method: syringe  Amount of cleaning: standard  Skin closure: staples  Number of sutures: 1  Approximation: close  Approximation difficulty: simple  Patient tolerance: patient tolerated the procedure well with no immediate complications      POC FAST US    Date/Time: 4/25/2025 10:00 PM    Performed by: Olman Keita MD  Authorized by: Olman Keita MD    Patient location:  Trauma  Procedure details:     Exam Type:  Diagnostic    Assess for:  Hemothorax, intra-abdominal fluid and pericardial effusion    Technique: FAST      Views obtained:  Heart - Pericardial sac, RUQ - Elkins's Pouch, LUQ - Splenorenal space and Suprapubic - Pouch of Samson    Image quality: diagnostic      Image availability:  Images available in PACS  FAST Findings:     RUQ (Hepatorenal) free fluid: absent      LUQ (Splenorenal) free fluid: absent      Suprapubic free fluid: absent      Cardiac wall motion: identified      Pericardial effusion: absent    Interpretation:     Impressions: negative

## 2025-04-26 NOTE — QUICK NOTE
Cervical Collar Clearance:    The patient had a CT scan of the cervical spine demonstrating no acute injury. On exam, the patient had no midline point tenderness or paresthesias/numbness/weakness in the extremities. The patient had full range of motion (was then able to flex, extend, and rotate head laterally) without pain. There were no distracting injuries and the patient was not intoxicated.      The patient's cervical spine was cleared radiologically and clinically. Cervical collar removed at this time.     Olman Keita MD  4/25/2025 10:40 PM

## 2025-04-26 NOTE — DISCHARGE INSTRUCTIONS
Please contact your PCP or follow-up at the trauma clinic for removal of your scalp staple in 7 to 10 days.    Recommend ice, Tylenol, ibuprofen for management of body aches and pain.     XR foot 3+ vw right   Final Result by Bozena Novak MD (04/25 2241)      No acute osseous abnormality.         I personally discussed this study with SATISH HAMMOND on 4/25/2025 10:36 PM.               Workstation performed: [id number]         TRAUMA - CT head wo contrast   Final Result by Bozena Novak MD (04/25 2241)      No acute intracranial abnormality.         I personally discussed this study with SATISH HAMMOND on 4/25/2025 10:32 PM.                     Workstation performed: [id number]         TRAUMA - CT spine cervical wo contrast   Final Result by Bozena Novak MD (04/25 2240)      No acute cervical spine fracture or traumatic malalignment.      I personally discussed this study with SATISH HAMMOND on 4/25/2025 10:32 PM.            Workstation performed: [id number]         TRAUMA - CT chest abdomen pelvis w contrast   Final Result by Bozena Novak MD (04/25 2241)      Mild subcutaneous fat stranding in the lower anterior abdominal wall compatible with soft tissue contusion.      Hepatomegaly and hepatic steatosis.      I personally discussed this study with SATISH HAMMOND on 4/25/2025 10:32 PM.         Workstation performed: [id number]         XR Trauma multiple (SLB/[facility] trauma bay ONLY)   Final Result by Bozena Novak MD (04/25 2241)      No acute cardiopulmonary disease within limitations of supine imaging.               Computerized Assisted Algorithm (CAA) may have been used to analyze all applicable images.            Workstation performed: [id number]         XR chest 1 view    (Results Pending)

## 2025-05-03 ENCOUNTER — OFFICE VISIT (OUTPATIENT)
Dept: URGENT CARE | Facility: CLINIC | Age: 20
End: 2025-05-03
Payer: COMMERCIAL

## 2025-05-03 VITALS
OXYGEN SATURATION: 96 % | TEMPERATURE: 97 F | RESPIRATION RATE: 20 BRPM | SYSTOLIC BLOOD PRESSURE: 127 MMHG | HEART RATE: 82 BPM | DIASTOLIC BLOOD PRESSURE: 76 MMHG

## 2025-05-03 DIAGNOSIS — Z48.02 ENCOUNTER FOR STAPLE REMOVAL: ICD-10-CM

## 2025-05-03 DIAGNOSIS — L25.5 RHUS DERMATITIS: Primary | ICD-10-CM

## 2025-05-03 PROCEDURE — 99213 OFFICE O/P EST LOW 20 MIN: CPT | Performed by: NURSE PRACTITIONER

## 2025-05-03 RX ORDER — FLUOCINONIDE 0.5 MG/G
OINTMENT TOPICAL 2 TIMES DAILY
Qty: 60 G | Refills: 0 | Status: SHIPPED | OUTPATIENT
Start: 2025-05-03

## 2025-05-03 RX ORDER — PREDNISONE 10 MG/1
TABLET ORAL
Qty: 42 TABLET | Refills: 0 | Status: SHIPPED | OUTPATIENT
Start: 2025-05-03

## 2025-05-03 NOTE — PATIENT INSTRUCTIONS
- Take Rx prednisone as prescribed  - Apply Rx topical steroid sparingly to affected areas as prescribed.  Do not apply to area around eyes.  Do not apply to skin longer for than 2 weeks continuously.  --Over-the-counter calamine lotion.  --Seek reevaluation if no resolution worsening over the next week.
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
## 2025-05-22 ENCOUNTER — OFFICE VISIT (OUTPATIENT)
Dept: PSYCHIATRY | Facility: CLINIC | Age: 20
End: 2025-05-22

## 2025-05-22 DIAGNOSIS — F33.42 MAJOR DEPRESSIVE DISORDER, RECURRENT EPISODE, IN FULL REMISSION (HCC): Primary | ICD-10-CM

## 2025-05-22 RX ORDER — ESCITALOPRAM OXALATE 20 MG/1
20 TABLET ORAL DAILY
Qty: 90 TABLET | Refills: 2 | Status: SHIPPED | OUTPATIENT
Start: 2025-05-22
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
## 2025-05-22 NOTE — ASSESSMENT & PLAN NOTE
Continue escitalopram 20 mg QD for antidepressant maintenance  Orders:  •  escitalopram (LEXAPRO) 20 mg tablet; Take 1 tablet (20 mg total) by mouth daily

## 2025-05-25 PROBLEM — S01.01XA SCALP LACERATION, INITIAL ENCOUNTER: Status: RESOLVED | Noted: 2025-04-25 | Resolved: 2025-05-25
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
## 2025-07-01 NOTE — NURSING NOTE
This writer has supervised and reviewed the documentation from [address] on today's date on for Cookie Walton RN as part of her orientation  No